# Patient Record
Sex: FEMALE | Race: WHITE | NOT HISPANIC OR LATINO | URBAN - NONMETROPOLITAN AREA
[De-identification: names, ages, dates, MRNs, and addresses within clinical notes are randomized per-mention and may not be internally consistent; named-entity substitution may affect disease eponyms.]

---

## 2018-01-25 NOTE — PATIENT DISCUSSION
DAN-Ref: Within 1 MONTH , Phone # [phone] )-( [phone] ), Secondary Phone # ( )-( )-( ), Type of referral:NEW PATIENT , Comments:GLAUCOMA EVAL . End DAN-Ref.

## 2018-01-25 NOTE — PATIENT DISCUSSION
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

## 2018-03-28 NOTE — PATIENT DISCUSSION
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

## 2018-03-28 NOTE — PATIENT DISCUSSION
DAN-Ref: Within 1 MONTH , Phone # [phone] )-( [phone] ), Secondary Phone # ( )-( )-( ), Type of referral:NEW PATIENT , Comments:GLAUCOMA EVAL . End DAN-Ref.

## 2018-09-26 NOTE — PATIENT DISCUSSION
DAN-Ref: Within 1 MONTH , Phone # [phone] )-( [phone] ), Secondary Phone # ( )-( )-( ), Type of referral:NEW PATIENT , Comments:GLAUCOMA EVAL . End DAN-Ref.

## 2019-09-11 NOTE — PATIENT DISCUSSION
DAN-Ref: Within 1 MONTH , Phone # [phone] )-( [phone] ), Secondary Phone # ( )-( )-( ), Type of referral:NEW PATIENT , Comments:GLAUCOMA EVAL . End DAN-Ref.

## 2020-05-28 ENCOUNTER — IMPORTED ENCOUNTER (OUTPATIENT)
Dept: URBAN - NONMETROPOLITAN AREA CLINIC 1 | Facility: CLINIC | Age: 65
End: 2020-05-28

## 2020-05-28 PROBLEM — H16.222: Noted: 2020-05-28

## 2020-05-28 PROBLEM — H35.3131: Noted: 2020-05-28

## 2020-05-28 PROBLEM — E11.3292: Noted: 2020-05-28

## 2020-05-28 PROBLEM — E11.9: Noted: 2020-05-28

## 2020-05-28 PROBLEM — H52.4: Noted: 2020-05-28

## 2020-05-28 PROBLEM — Z96.1: Noted: 2020-05-28

## 2020-05-28 PROCEDURE — 99213 OFFICE O/P EST LOW 20 MIN: CPT

## 2020-05-28 NOTE — PATIENT DISCUSSION
REID - Discussed diagnosis in detail with patient- Discussed signs and symptoms of progression- Recommend drinking plenty of water - Start Zyet TID OS sample given today - Start cool ocmporesses through out the day- Continue to montior - RTC A/S  ------------------------------previous notes--------------------------------CL Check- Discussed diagnosis in detail with patient - New trial CLS given today patient to call and let us know if she likes them if so she can order - Continue to monitor - RTC 1 year complete  ARMD OU- Discussed diagnosis in detail with patient- Recommend  patient continue eye vitamins daily such as Preservision- Recommend that patient continue following the [address] patient to call or come into the office ASAP if any changes noted from today.  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- Continue to monitorNIDDM (not dure how long)- Discussed diagnosis in detail with patient(Patient states that she is not being treated at this time)- Discussed signs and symptoms - Stressed importance of good blood sugar control- Recommend no soda’s- 1-2 dot blots noted OS today posterior pole - Continue to monitorPseudophakia OU- Discussed diagnosis in detail with patient - S/P Yag PC OU- good central opening - Patient is stable at this time - Continue to monitor Presbyopia OU- Discussed diagnosis in detail with patient- New glasses Rx given today- Trial CLS given today patient to follow up in 1 week - Continue to monitor; 's Notes: OCT 10/17/18MR 10/17/18DFE 10/17/18

## 2020-11-20 ENCOUNTER — IMPORTED ENCOUNTER (OUTPATIENT)
Dept: URBAN - NONMETROPOLITAN AREA CLINIC 1 | Facility: CLINIC | Age: 65
End: 2020-11-20

## 2020-11-20 PROBLEM — H35.3131: Noted: 2020-11-20

## 2020-11-20 PROBLEM — H16.223: Noted: 2020-11-20

## 2020-11-20 PROBLEM — H52.4: Noted: 2020-11-20

## 2020-11-20 PROBLEM — E11.9: Noted: 2020-11-20

## 2020-11-20 PROBLEM — H16.222: Noted: 2020-11-20

## 2020-11-20 PROBLEM — Z96.1: Noted: 2020-11-20

## 2020-11-20 PROCEDURE — 92015 DETERMINE REFRACTIVE STATE: CPT

## 2020-11-20 PROCEDURE — 92014 COMPRE OPH EXAM EST PT 1/>: CPT

## 2020-11-20 NOTE — PATIENT DISCUSSION
Presbyopia OU/- Discussed diagnosis in detail with patient- New glasses Rx given today- Continue to monitorDES - Discussed diagnosis in detail with patient- Discussed signs and symptoms of progression- Recommend drinking plenty of water -Start Zaditor or [place] and suggest Lumify - Start cool ocmporesses through out the day- Continue to [employer] - RTC A/S  ARMD OU- Discussed diagnosis in detail with patient- Recommend  patient continue eye vitamins daily such as Preservision- Recommend that patient continue following the [address] patient to call or come into the office ASAP if any changes noted from today. 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  - Letter to Dr. Corine Rose- Discussed signs and symptoms - Stressed importance of good blood sugar control- Recommend no soda’s- Continue to monitorPseudophakia OU- Discussed diagnosis in detail with patient - S/P Yag PC OU- good central opening - Patient is stable at this time - Continue to monitor; 's Notes: OCT 10/17/18MR 11/20/20DFE 11/20/20

## 2022-04-09 ASSESSMENT — VISUAL ACUITY
OS_CC: 20/20
OD_CC: 20/20
OS_CC: 20/20
OD_CC: 20/20

## 2022-04-09 ASSESSMENT — TONOMETRY
OD_IOP_MMHG: 12
OS_IOP_MMHG: 16
OS_IOP_MMHG: 12
OD_IOP_MMHG: 17

## 2022-07-05 ENCOUNTER — FOLLOW UP (OUTPATIENT)
Dept: URBAN - NONMETROPOLITAN AREA CLINIC 1 | Facility: CLINIC | Age: 67
End: 2022-07-05

## 2022-07-05 DIAGNOSIS — H52.4: ICD-10-CM

## 2022-07-05 DIAGNOSIS — H35.3131: ICD-10-CM

## 2022-07-05 PROCEDURE — 92014 COMPRE OPH EXAM EST PT 1/>: CPT

## 2022-07-05 PROCEDURE — 92134 CPTRZ OPH DX IMG PST SGM RTA: CPT

## 2022-07-05 PROCEDURE — 92015 DETERMINE REFRACTIVE STATE: CPT

## 2022-07-05 ASSESSMENT — TONOMETRY
OS_IOP_MMHG: 12
OD_IOP_MMHG: 10

## 2022-07-05 ASSESSMENT — VISUAL ACUITY
OD_CC: 20/25-
OS_CC: 20/22-

## 2023-08-18 ENCOUNTER — ESTABLISHED PATIENT (OUTPATIENT)
Dept: URBAN - NONMETROPOLITAN AREA CLINIC 1 | Facility: CLINIC | Age: 68
End: 2023-08-18

## 2023-08-18 DIAGNOSIS — H52.4: ICD-10-CM

## 2023-08-18 DIAGNOSIS — H35.3131: ICD-10-CM

## 2023-08-18 DIAGNOSIS — E11.9: ICD-10-CM

## 2023-08-18 PROCEDURE — 92014 COMPRE OPH EXAM EST PT 1/>: CPT

## 2023-08-18 PROCEDURE — 92015 DETERMINE REFRACTIVE STATE: CPT

## 2023-08-18 ASSESSMENT — TONOMETRY
OS_IOP_MMHG: 11
OD_IOP_MMHG: 12

## 2023-08-18 ASSESSMENT — VISUAL ACUITY
OS_CC: 20/25
OD_CC: 20/29

## 2024-06-24 ENCOUNTER — ESTABLISHED PATIENT (OUTPATIENT)
Dept: URBAN - NONMETROPOLITAN AREA CLINIC 1 | Facility: CLINIC | Age: 69
End: 2024-06-24

## 2024-06-24 DIAGNOSIS — Z96.1: ICD-10-CM

## 2024-06-24 DIAGNOSIS — E11.9: ICD-10-CM

## 2024-06-24 DIAGNOSIS — H16.222: ICD-10-CM

## 2024-06-24 DIAGNOSIS — H35.3131: ICD-10-CM

## 2024-06-24 PROCEDURE — 92134 CPTRZ OPH DX IMG PST SGM RTA: CPT

## 2024-06-24 PROCEDURE — 99213 OFFICE O/P EST LOW 20 MIN: CPT

## 2024-06-24 ASSESSMENT — TONOMETRY
OD_IOP_MMHG: 13
OS_IOP_MMHG: 13

## 2024-06-24 ASSESSMENT — VISUAL ACUITY
OS_CC: 20/20
OD_CC: 20/20

## 2025-01-17 ENCOUNTER — EMERGENCY VISIT (OUTPATIENT)
Age: 70
End: 2025-01-17

## 2025-01-17 DIAGNOSIS — B00.51: ICD-10-CM

## 2025-01-17 PROCEDURE — 99214 OFFICE O/P EST MOD 30 MIN: CPT

## 2025-01-17 RX ORDER — BUSPIRONE HYDROCHLORIDE 10 MG/1: 1 TABLET ORAL

## 2025-03-03 ENCOUNTER — FOLLOW UP (OUTPATIENT)
Age: 70
End: 2025-03-03

## 2025-03-03 DIAGNOSIS — H35.3131: ICD-10-CM

## 2025-03-03 DIAGNOSIS — H43.812: ICD-10-CM

## 2025-03-03 DIAGNOSIS — E11.9: ICD-10-CM

## 2025-03-03 DIAGNOSIS — H16.223: ICD-10-CM

## 2025-03-03 PROCEDURE — 92250 FUNDUS PHOTOGRAPHY W/I&R: CPT

## 2025-03-03 PROCEDURE — 99213 OFFICE O/P EST LOW 20 MIN: CPT
